# Patient Record
(demographics unavailable — no encounter records)

---
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
## 2018-01-30 NOTE — RADIOLOGY REPORT (SQ)
EXAM DESCRIPTION:  L SPINE WHOLE



COMPLETED DATE/TIME:  1/30/2018 2:08 pm



REASON FOR STUDY:  Acute LBP



COMPARISON:  None.



NUMBER OF VIEWS:  Five views including obliques.



TECHNIQUE:  AP, lateral, oblique, and sacral radiographic images acquired of the lumbar spine.



LIMITATIONS:  None.



FINDINGS:  MINERALIZATION: Normal.

SEGMENTATION: Normal.  No transitional anatomy.

ALIGNMENT: Normal.

VERTEBRAE: Maintained height.  No fracture or worrisome bone lesion.

DISCS: Preserved height.  No significant osteophytes or end plate irregularity.

POSTERIOR ELEMENTS: Pedicles and facets are intact.  No pars defect or posterior arch defects.

HARDWARE: None in the spine.

PARASPINAL SOFT TISSUES: Normal.

PELVIS: Intact as visualized. No fractures or worrisome bone lesions. SI joints intact.

OTHER: No other significant finding.



IMPRESSION:  NORMAL 5 VIEW LUMBAR SPINE.



TECHNICAL DOCUMENTATION:  JOB ID:  [id number]

 2011 [employer]- All Rights Reserved

## 2018-03-14 NOTE — RADIOLOGY REPORT (SQ)
EXAM DESCRIPTION:  CERV SP 3 VIEW OR LESS



COMPLETED DATE/TIME:  3/14/2018 11:45 am



REASON FOR STUDY:  CERVICAL RADICULOPATHY (FLEX/EXT)



COMPARISON:  None.



TECHNIQUE:  Lateral flexion and extension radiographs of the spine.



NUMBER OF VIEWS:  Two views.



LIMITATIONS:  None.



FINDINGS:  There is minimal cervical spondylosis at C4-5.  Posterior cervical fusion C6-7.  Otherwise
 no malalignment identified.



IMPRESSION:  Status post cervical fusion C6-7.  Minimal cervical spondylosis.



TECHNICAL DOCUMENTATION:  JOB ID:  [id number]

[id number]

 2011 [employer] All Rights Reserved



Reading location - IP/workstation name: [email or web address]